# Patient Record
Sex: MALE | URBAN - METROPOLITAN AREA
[De-identification: names, ages, dates, MRNs, and addresses within clinical notes are randomized per-mention and may not be internally consistent; named-entity substitution may affect disease eponyms.]

---

## 2017-09-21 ENCOUNTER — TRANSFERRED RECORDS (OUTPATIENT)
Dept: HEALTH INFORMATION MANAGEMENT | Facility: CLINIC | Age: 52
End: 2017-09-21

## 2017-09-27 ENCOUNTER — TRANSFERRED RECORDS (OUTPATIENT)
Dept: HEALTH INFORMATION MANAGEMENT | Facility: CLINIC | Age: 52
End: 2017-09-27

## 2017-10-02 ENCOUNTER — TRANSFERRED RECORDS (OUTPATIENT)
Dept: HEALTH INFORMATION MANAGEMENT | Facility: CLINIC | Age: 52
End: 2017-10-02

## 2017-12-01 ENCOUNTER — PRE VISIT (OUTPATIENT)
Dept: UROLOGY | Facility: CLINIC | Age: 52
End: 2017-12-01

## 2017-12-04 ENCOUNTER — OFFICE VISIT (OUTPATIENT)
Dept: UROLOGY | Facility: CLINIC | Age: 52
End: 2017-12-04

## 2017-12-04 VITALS
WEIGHT: 201.1 LBS | SYSTOLIC BLOOD PRESSURE: 92 MMHG | HEART RATE: 98 BPM | DIASTOLIC BLOOD PRESSURE: 54 MMHG | HEIGHT: 65 IN | BODY MASS INDEX: 33.51 KG/M2

## 2017-12-04 DIAGNOSIS — C60.9 SCC (SQUAMOUS CELL CARCINOMA), PENIS (H): Primary | ICD-10-CM

## 2017-12-04 RX ORDER — CLOTRIMAZOLE 1 %
CREAM (GRAM) TOPICAL
COMMUNITY

## 2017-12-04 RX ORDER — ASPIRIN 81 MG/1
81 TABLET, CHEWABLE ORAL
COMMUNITY
Start: 2017-11-13

## 2017-12-04 RX ORDER — TRAZODONE HYDROCHLORIDE 50 MG/1
50 TABLET, FILM COATED ORAL
COMMUNITY

## 2017-12-04 RX ORDER — LORATADINE 10 MG/1
10 TABLET ORAL
COMMUNITY
Start: 2017-11-11

## 2017-12-04 RX ORDER — NYSTATIN 100000 [USP'U]/G
POWDER TOPICAL
COMMUNITY

## 2017-12-04 RX ORDER — GABAPENTIN 300 MG/1
600 CAPSULE ORAL
COMMUNITY
Start: 2017-08-16

## 2017-12-04 RX ORDER — FLUVASTATIN 20 MG/1
20 CAPSULE ORAL
COMMUNITY
Start: 2017-11-22

## 2017-12-04 RX ORDER — VALACYCLOVIR HYDROCHLORIDE 500 MG/1
500 TABLET, FILM COATED ORAL
COMMUNITY
Start: 2017-11-13

## 2017-12-04 RX ORDER — FINASTERIDE 5 MG/1
5 TABLET, FILM COATED ORAL
COMMUNITY
Start: 2017-10-12

## 2017-12-04 RX ORDER — TRIAMCINOLONE ACETONIDE 1 MG/G
CREAM TOPICAL
COMMUNITY

## 2017-12-04 RX ORDER — ACETAMINOPHEN 325 MG/1
650 TABLET ORAL
COMMUNITY

## 2017-12-04 RX ORDER — OXYCODONE HYDROCHLORIDE 30 MG/1
30 TABLET ORAL
COMMUNITY
Start: 2017-11-28

## 2017-12-04 RX ORDER — ALBUTEROL SULFATE 90 UG/1
2 AEROSOL, METERED RESPIRATORY (INHALATION)
COMMUNITY
Start: 2017-09-12

## 2017-12-04 RX ORDER — FUROSEMIDE 20 MG
20 TABLET ORAL
COMMUNITY
Start: 2017-10-23

## 2017-12-04 RX ORDER — DIPHENOXYLATE HYDROCHLORIDE AND ATROPINE SULFATE 2.5; .025 MG/1; MG/1
1 TABLET ORAL
COMMUNITY
Start: 2017-11-11

## 2017-12-04 RX ORDER — FLUNISOLIDE 0.25 MG/ML
2 SOLUTION NASAL
COMMUNITY
Start: 2017-11-29

## 2017-12-04 RX ORDER — BACITRACIN ZINC 500 [USP'U]/G
OINTMENT TOPICAL
COMMUNITY
Start: 2017-06-13

## 2017-12-04 RX ORDER — GLUCOSAMINE HCL/CHONDROITIN SU 500-400 MG
CAPSULE ORAL
COMMUNITY
Start: 2017-10-24

## 2017-12-04 RX ORDER — CEFUROXIME AXETIL 500 MG/1
500 TABLET ORAL
COMMUNITY
Start: 2017-09-27

## 2017-12-04 RX ORDER — DOXAZOSIN 2 MG/1
2 TABLET ORAL
COMMUNITY
Start: 2017-06-13

## 2017-12-04 ASSESSMENT — ENCOUNTER SYMPTOMS
PANIC: 0
MUSCLE WEAKNESS: 0
NECK PAIN: 0
NERVOUS/ANXIOUS: 0
INSOMNIA: 0
BACK PAIN: 1
MYALGIAS: 1
MUSCLE CRAMPS: 1
STIFFNESS: 0
DECREASED CONCENTRATION: 0
JOINT SWELLING: 1
ARTHRALGIAS: 1
DEPRESSION: 1

## 2017-12-04 ASSESSMENT — PAIN SCALES - GENERAL: PAINLEVEL: NO PAIN (0)

## 2017-12-04 NOTE — LETTER
2017       RE: Koffi Flowers  [address] [address]     Dear Colleague,    Thank you for referring your patient, Koffi Flowers, to the [facility] UROLOGY AND [facility] FOR PROSTATE AND UROLOGIC CANCERS at [facility]. Please see a copy of my visit note below.    Urology Consult    Name:  Koffi Flowers  MRN:  [id number]  Age/: 51 year old, 1965    CC: Urethrocutaneous fistula    HPI: Koffi Flowers is a(n) 51 year old male with, per recent note by Gregorio Rodriguez & Maxwell Corbin at [facility], 'a PMH of HIV (on meds), chronic hepatitis B, diffuse large b cell lymphoma s/p chemotherapy (in remission), DMII, anal cancer s/p resection with pelvic radiation and colostomy in  (in remission) who was initially seen for condyloma involving penis , scrotum and penis, s/p excision and debridement of penile/scrotal skin (down ti bucks fascia and tunica vaginalis) and skin grafting 2017.  Pathology of the area, however, showed squamous cell carcinoma, and therefore the allograft was removed and further excision of the area was accomplished on 17.  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 Pathology showed negative margins for that operation and he had further excision with STSG on 2017. Upon discharge, solano catheter was removed and it was noted that he was leaking urine from the penoscrotal junction, consistent with urethral fistual. Patient has been managed with solano catheter since then. In th meantime he was seen in follow up, and his catheter was noted to be causing progressive urethral erosion with a majority of his urethra opened by erosion through his grafted tissue to the level of his peno-scrotal junction.  He have had recurrent complains from pain, scarring and infection of the grafted area as well  as regrowth which was recently excised at the left and right groin area (9/14/17) with right specimen showing cancer involvement (Skin, right suprapubic mass, excision - Invasive squamous cell carcinoma, involving all margins).    Patient has been seen by his PCP at [facility] for recurrent wound complains and has received courses of Augmentin and now on keflex for possible infection. His complains mainly are pain and drainage...  Pt hospitalized 8/29 at [facility] for right groin pain. Found to have abscess in the ED that was drained. Pt left A when surgery was not offered, [facility] [place] refused transfer.     Patient has also been referred to oncology to discuss latest biopsy results; he was seen at [place] [facility].      Review of outside records for 15 minutes:  Recent admission  1. 5/15, Dr. Tobar, excision and allografting perineum, bilateral groins, lower abdomen.  Dr. Mckinnon, excision and debridement of penile skin, excision and debridement of scrotal skin  2. 5/22, Dr. Tobar, excisional debridement perineal wound               Dr. Honeycutt, Punch biopsy of glans penis, Genital skin resection - dermal edge of prior resection site in groin  3. 5/23, Dr. Honeycutt, biopsy genital skin and penis  4. 5/31, Dr. Tobar, re-excision perineum  5. 6/6, Dr. Tobar, re-excision and skin grafitng perineal wound  6. 9/14 Dr Tobar, re-excision of left and right groin area'    The patient was most recently seen again by Dr. Corbin on 11/17/2017 and was hoping to proceed with srgical intervention (pelvic exenteration vs staging).  He underwent pelvic MRI for pre-operative planning.    Past Medical History:  No past medical history on file.    Past Surgical History:  No past surgical history on file.    Allergies:   Allergies not on file    Medications:    No current outpatient prescriptions on file prior to visit.  No current facility-administered medications on file prior to visit.     Social History:  Social History     Social  History     Marital status: Single     Spouse name: N/A     Number of children: N/A     Years of education: N/A     Occupational History     Not on file.     Social History Main Topics     Smoking status: Not on file     Smokeless tobacco: Not on file     Alcohol use Not on file     Drug use: Not on file     Sexual activity: Not on file     Other Topics Concern     Not on file     Social History Narrative       Family History:  No family history on file.    ROS:  The remainder of the complete ROS was negative unless noted in the HPI.    Exam:  There were no vitals taken for this visit.  General: Alert, interactive, in obvious discomfort  Abdomen: Soft, nontender, nondistended. There is a fungating mass in the suprapubic area, extending down to the penis and out to the inguinal creases bilaterally.   : penis is skin grafted and the shaft is free of tumor; everything around the penis is tumor  Extremities: this extends through the inguinal nodes bilaterally and onto the area of the femoral vessels. The lower extremities are very edematous.     MR of the prostate & pelvis with contrast  Impression: In this patient with history of squamous cell carcinoma, current MRI scan shows:  1. Fungating enhancing superficial mass centered about the peroneum extending from the anus to the low anterior abdomen and laterally along the distal inguinal crease and medial thighs. As described in the report, the mass invades or abuts the apex of the prostate, the external anal orifice, anterior inferior pubic ramus/left pubic body and adjacent musculature, and base of the penis. The mass appears to abut the left femoral vein. No supralevator extension and no obvious involvement of the bladder or base of the prostate    Assessment and Plan: Koffi Flowers is a(n) 51 year old male with advanced SCC of the genitalia. [place] [facility] [place] that [place] doing a PET-CT to look for distant disease. However, I explained to  Dr. Watts and the patient that I'm not sure that a PET/CT would change the course of action. Based on his physical exam and the MRI findings I think that his only surgical option would be a destiny-corpectomy. I explained what to the patient what this would mean and that an alternative would be hospice care. If he elects for hospice I explained that I expect he could have a major bleeding episode from his femoral vessels in the next year. This is difficult discussion with the patient expressed understanding. I explained that we will discuss him at tumor board and get back to him with any other recommendations. He appreciated this.    Again, thank you for allowing me to participate in the care of your patient.      Sincerely,    Brijesh Mulligan MD

## 2017-12-04 NOTE — MR AVS SNAPSHOT
After Visit Summary   12/4/2017    Koffi Flowers    MRN: [id number]           Patient Information     Date Of Birth          1965        Visit Information        Provider Department      12/4/2017 12:30 PM Brijesh Mulligan MD [facility] Urology and [facility] for Prostate and Urologic Cancers        Today's Diagnoses     SCC (squamous cell carcinoma), penis (H)    -  1      Care Instructions    Follow up as needed.    It was a pleasure meeting with you today.  Thank you for allowing me and my team the privilege of caring for you today.  YOU are the reason we are here, and I truly hope we provided you with the excellent service you deserve.  Please let us know if there is anything else we can do for you so that we can be sure you are leaving completely satisfied with your care experience.                  Follow-ups after your visit        Who to contact     Please call your clinic at [phone] to:    Ask questions about your health    Make or cancel appointments    Discuss your medicines    Learn about your test results    Speak to your doctor   If you have compliments or concerns about an experience at your clinic, or if you wish to file a complaint, please contact [facility] Patient Relations at [phone] or email us at [email or web address]         Additional Information About Your Visit        MyChart Information     [email or web address] is an electronic gateway that provides easy, online access to your medical records. With foodpanda / hellofood, you can request a clinic appointment, read your test results, renew a prescription or communicate with your care team.     To sign up for [email or web address] visit the website at [email or web address]   You will be asked to enter the access code listed below, as well as some personal information. Please follow the directions to create your username and password.     Your access code is: [email or web address]  Expires: 3/4/2018  6:30 AM     Your  "access code will  in 90 days. If you need help or a new code, please contact your [facility] or call [phone] for assistance.        Care EveryWhere ID     This is your Care EveryWhere ID. This could be used by other organizations to access your [place] medical records  [id number]        Your Vitals Were     Pulse Height BMI (Body Mass Index)             98 1.651 m (5' 5\") 33.46 kg/m2          Blood Pressure from Last 3 Encounters:   17 92/54    Weight from Last 3 Encounters:   17 91.2 kg (201 lb 1.6 oz)              Today, you had the following     No orders found for display       Primary Care Provider    None Specified       No primary provider on file.        Equal Access to Services     KAYDEN STEVEN : Hadii alan Gaytan, wasabinoda addie, qaybta kaalmada lacey, beto smith . So [facility] [phone].    ATENCIÓN: Si habla español, tiene a conti disposición servicios gratuitos de asistencia lingüística. Llame al [phone].    We comply with applicable federal civil rights laws and [place] laws. We do not discriminate on the basis of race, color, national origin, age, disability, sex, sexual orientation, or gender identity.            Thank you!     Thank you for choosing [facility] UROLOGY AND [facility] FOR PROSTATE AND UROLOGIC CANCERS  for your care. Our goal is always to provide you with excellent care. Hearing back from our patients is one way we can continue to improve our services. Please take a few minutes to complete the written survey that you may receive in the mail after your visit with us. Thank you!             Your Updated Medication List - Protect others around you: Learn how to safely use, store and throw away your medicines at www.disposemymeds.org.          This list is accurate as of: 17  5:02 PM.  Always use your most recent med list.                   Brand Name Dispense Instructions for use " Diagnosis    acetaminophen 325 MG tablet    TYLENOL     Take 650 mg by mouth        albuterol 108 (90 BASE) MCG/ACT Inhaler    PROAIR HFA/PROVENTIL HFA/VENTOLIN HFA     Inhale 2 puffs into the lungs        aspirin 81 MG chewable tablet      Take 81 mg by mouth        B-D U/F 31G X 5 MM   Generic drug:  insulin pen needle      For administering insulin at home four times daily        bacitracin ointment      Smear to donor daily.        Bacitracin-Neomycin-Polymyxin 400-5-5000 Oint           BLOOD GLUCOSE TEST STRIPS Strp      Dispense item covered by pt ins. E11.9 IDDM type II - Test 4 times/day        cefuroxime 500 MG tablet    CEFTIN     Take 500 mg by mouth        clotrimazole 1 % cream    LOTRIMIN          darunavir-cobicistat 800-150 MG per tablet    PREZCOBIX     Take 1 tablet by mouth        doxazosin 2 MG tablet    CARDURA     Take 2 mg by mouth        emtricitabine-tenofovir -25 MG per tablet    DESCOVY     Take 1 tablet by mouth        finasteride 5 MG tablet    PROSCAR     Take 5 mg by mouth        flunisolide 25 MCG/ACT (0.025%) Soln spray    NASALIDE     2 sprays        fluvastatin 20 MG capsule    LESCOL     Take 20 mg by mouth        furosemide 20 MG tablet    LASIX     Take 20 mg by mouth        gabapentin 300 MG capsule    NEURONTIN     Take 600 mg by mouth        insulin aspart 100 UNIT/ML injection    NovoLOG PEN     Inject 2 units per carb choice before mealsInject 2 units per carb choice before meals        insulin glargine 100 UNIT/ML injection    LANTUS     Inject 60 Units Subcutaneous        loratadine 10 MG tablet    CLARITIN     Take 10 mg by mouth        MULTI-VITAMINS Tabs      Take 1 tablet by mouth        nystatin 456121 UNIT/GM Powd    MYCOSTATIN          oxyCODONE IR 30 MG tablet    ROXICODONE     Take 30 mg by mouth        traZODone 50 MG tablet    DESYREL     Take 50 mg by mouth        triamcinolone 0.1 % cream    KENALOG          valACYclovir 500 MG tablet    VALTREX      Take 500 mg by mouth

## 2017-12-04 NOTE — NURSING NOTE
"Chief Complaint   Patient presents with     Consult     Urethral fistula consult       Blood pressure 92/54, pulse 98, height 1.651 m (5' 5\"), weight 91.2 kg (201 lb 1.6 oz). Body mass index is 33.46 kg/(m^2).    There is no problem list on file for this patient.      Allergies   Allergen Reactions     Fentanyl Hives     Generalized skin itching     No Clinical Screening - See Comments Other (See Comments)       Current Outpatient Prescriptions   Medication Sig Dispense Refill     albuterol (PROAIR HFA/PROVENTIL HFA/VENTOLIN HFA) 108 (90 BASE) MCG/ACT Inhaler Inhale 2 puffs into the lungs       aspirin 81 MG chewable tablet Take 81 mg by mouth       insulin pen needle (B-D U/F) 31G X 5 MM For administering insulin at home four times daily       Glucose Blood (BLOOD GLUCOSE TEST STRIPS) STRP Dispense item covered by pt ins. E11.9 IDDM type II - Test 4 times/day       cefuroxime (CEFTIN) 500 MG tablet Take 500 mg by mouth       darunavir-cobicistat (PREZCOBIX) 800-150 MG per tablet Take 1 tablet by mouth       doxazosin (CARDURA) 2 MG tablet Take 2 mg by mouth       emtricitabine-tenofovir AF (DESCOVY) 200-25 MG per tablet Take 1 tablet by mouth       finasteride (PROSCAR) 5 MG tablet Take 5 mg by mouth       flunisolide (NASALIDE) 25 MCG/ACT (0.025%) SOLN spray 2 sprays       fluvastatin (LESCOL) 20 MG capsule Take 20 mg by mouth       furosemide (LASIX) 20 MG tablet Take 20 mg by mouth       gabapentin (NEURONTIN) 300 MG capsule Take 600 mg by mouth       insulin aspart (NOVOLOG PEN) 100 UNIT/ML injection Inject 2 units per carb choice before mealsInject 2 units per carb choice before meals       insulin glargine (LANTUS) 100 UNIT/ML injection Inject 60 Units Subcutaneous       loratadine (CLARITIN) 10 MG tablet Take 10 mg by mouth       Multiple Vitamin (MULTI-VITAMINS) TABS Take 1 tablet by mouth       oxyCODONE IR (ROXICODONE) 30 MG tablet Take 30 mg by mouth       valACYclovir (VALTREX) 500 MG tablet Take 500 mg " by mouth       bacitracin ointment Smear to donor daily.       acetaminophen (TYLENOL) 325 MG tablet Take 650 mg by mouth       clotrimazole (LOTRIMIN) 1 % cream        Neomycin-Bacitracin-Polymyxin (BACITRACIN-NEOMYCIN-POLYMYXIN) 400-5-5000 OINT        nystatin (MYCOSTATIN) 848750 UNIT/GM POWD        traZODone (DESYREL) 50 MG tablet Take 50 mg by mouth       triamcinolone (KENALOG) 0.1 % cream          Social History   Substance Use Topics     Smoking status: Former Smoker     Quit date: 12/4/2015     Smokeless tobacco: Never Used     Alcohol use Not on file       EDWARD Inman  12/4/2017  12:28 PM

## 2017-12-04 NOTE — PROGRESS NOTES
Urology Consult    Name:  Koffi Flowers  MRN:  [id number]  Age/: 51 year old, 1965    CC: Urethrocutaneous fistula    HPI: Koffi Flowers is a(n) 51 year old male with, per recent note by Gregorio Rodriguez & Maxwell Corbin at [facility] [place], 'a PMH of HIV (on meds), chronic hepatitis B, diffuse large b cell lymphoma s/p chemotherapy (in remission), DMII, anal cancer s/p resection with pelvic radiation and colostomy in  (in remission) who was initially seen for condyloma involving penis , scrotum and penis, s/p excision and debridement of penile/scrotal skin (down ti bucks fascia and tunica vaginalis) and skin grafting 2017.  Pathology of the area, however, showed squamous cell carcinoma, and therefore the allograft was removed and further excision of the area was accomplished on 17.  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 Pathology showed negative margins for that operation and he had further excision with STSG on 2017. Upon discharge, solano catheter was removed and it was noted that he was leaking urine from the penoscrotal junction, consistent with urethral fistual. Patient has been managed with solano catheter since then. In th meantime he was seen in follow up, and his catheter was noted to be causing progressive urethral erosion with a majority of his urethra opened by erosion through his grafted tissue to the level of his peno-scrotal junction.  He have had recurrent complains from pain, scarring and infection of the grafted area as well as regrowth which was recently excised at the left and right groin area (17) with right specimen showing cancer involvement (Skin, right suprapubic mass, excision - Invasive squamous cell carcinoma, involving all margins).    Patient has been seen by his PCP at [facility] for recurrent wound complains and has received  courses of Augmentin and now on keflex for possible infection. His complains mainly are pain and drainage...  Pt hospitalized 8/29 at [facility] for right groin pain. Found to have abscess in the ED that was drained. Pt left AMA when surgery was not offered, [facility] refused transfer.     Patient has also been referred to oncology to discuss latest biopsy results; he was seen at [place] [facility].      Review of outside records for 15 minutes:  Recent admission  1. 5/15, Dr. Tobar, excision and allografting perineum, bilateral groins, lower abdomen.  Dr. Mckinnon, excision and debridement of penile skin, excision and debridement of scrotal skin  2. 5/22, Dr. Tobar, excisional debridement perineal wound               Dr. Honeycutt, Punch biopsy of glans penis, Genital skin resection - dermal edge of prior resection site in groin  3. 5/23, Dr. Honeycutt, biopsy genital skin and penis  4. 5/31, Dr. Tobar, re-excision perineum  5. 6/6, Dr. Tobar, re-excision and skin grafitng perineal wound  6. 9/14 Dr Tobar, re-excision of left and right groin area'    The patient was most recently seen again by Dr. Corbin on 11/17/2017 and was hoping to proceed with srgical intervention (pelvic exenteration vs staging).  He underwent pelvic MRI for pre-operative planning.    Past Medical History:  No past medical history on file.    Past Surgical History:  No past surgical history on file.    Allergies:   Allergies not on file    Medications:    No current outpatient prescriptions on file prior to visit.  No current facility-administered medications on file prior to visit.     Social History:  Social History     Social History     Marital status: Single     Spouse name: N/A     Number of children: N/A     Years of education: N/A     Occupational History     Not on file.     Social History Main Topics     Smoking status: Not on file     Smokeless tobacco: Not on file     Alcohol use Not on file     Drug use: Not on file     Sexual  activity: Not on file     Other Topics Concern     Not on file     Social History Narrative       Family History:  No family history on file.    ROS:  The remainder of the complete ROS was negative unless noted in the HPI.    Exam:  There were no vitals taken for this visit.  General: Alert, interactive, in obvious discomfort  Abdomen: Soft, nontender, nondistended. There is a fungating mass in the suprapubic area, extending down to the penis and out to the inguinal creases bilaterally.   : penis is skin grafted and the shaft is free of tumor; everything around the penis is tumor  Extremities: this extends through the inguinal nodes bilaterally and onto the area of the femoral vessels. The lower extremities are very edematous.     MR of the prostate & pelvis with contrast  Impression: In this patient with history of squamous cell carcinoma, current MRI scan shows:  1. Fungating enhancing superficial mass centered about the peroneum extending from the anus to the low anterior abdomen and laterally along the distal inguinal crease and medial thighs. As described in the report, the mass invades or abuts the apex of the prostate, the external anal orifice, anterior inferior pubic ramus/left pubic body and adjacent musculature, and base of the penis. The mass appears to abut the left femoral vein. No supralevator extension and no obvious involvement of the bladder or base of the prostate    Assessment and Plan: Koffi Flowers is a(n) 51 year old male with advanced SCC of the genitalia. I talked with Maxwell Corbin at [facility] [place] who said that he doing a PET-CT to look for distant disease. However, I explained to Dr. Watts and the patient that I'm not sure that a PET/CT would change the course of action. Based on his physical exam and the MRI findings I think that his only surgical option would be a destiny-corpectomy. I explained what to the patient what this would mean and that an alternative would be hospice care. If he  elects for hospice I explained that I expect he could have a major bleeding episode from his femoral vessels in the next year. This is difficult discussion with the patient expressed understanding. I explained that we will discuss him at tumor board and get back to him with any other recommendations. He appreciated this.    =======================================================  As the attending surgeon I, Brijesh Mulligan, interviewed and examined the patient. The plan was developed between me and the patient. My findings and plan are as stated by the resident.    Brijesh Mulligan MD